# Patient Record
Sex: MALE | URBAN - METROPOLITAN AREA
[De-identification: names, ages, dates, MRNs, and addresses within clinical notes are randomized per-mention and may not be internally consistent; named-entity substitution may affect disease eponyms.]

---

## 2024-03-01 ENCOUNTER — TELEMEDICINE (OUTPATIENT)
Dept: BEHAVIORAL HEALTH | Facility: CLINIC | Age: 34
End: 2024-03-01
Payer: COMMERCIAL

## 2024-03-01 DIAGNOSIS — F41.9 ANXIETY: ICD-10-CM

## 2024-03-01 DIAGNOSIS — F33.0 MILD RECURRENT MAJOR DEPRESSION (CMS-HCC): ICD-10-CM

## 2024-03-01 PROCEDURE — 90837 PSYTX W PT 60 MINUTES: CPT | Performed by: COUNSELOR

## 2024-03-01 NOTE — PROGRESS NOTES
"Start time:1:05 pm  End time: 2:00 pm  Total time: 55 minutes  Last visit: 5/11/2022  Diagnosis: Anxiety, MDD  Micki returns to therapy today reporting his divorce finalized December 2022.  He is currently in a committed relationship with Vick, \"things are great.\"  He reports increased anxiety and recent panic attacks triggered by a serious conversation about getting  and guilt feelings about messaging other girls.  Said his two worst days on 2/19 & 2/20 - experienced lightheadedness,  irrational thoughts, extreme worry and was unable to sleep or concentrate.  He reached out to his mother for support and later decided to confess to Vick.  He was surprised/pleased that Vick was supportive, \"it lifted a huge weight off my chest.\"  He was very emotional during this visit.  He feels guilty for his actions (messaging other girls) and notes feeling afraid to get . Marv recognizes that he lacks self confidence and returns to therapy seeking help to gain more awareness and skills to manage his anxiety.   He is still working at [employer], enjoys his work.  PHQ9-16, GAD7-16.  His PCP prescribed Prozac this past Saturday.  Provider welcomed him back to therapy, offered supportive feedback.  Offered psychoeducation on his illness.  Reviewed CBT - cognitive triangle and coping skills. Spent time exploring his expectations for treatment, used the miracle question.  Reflected on his self care. F/U 1 week  HW-  Dedicate 10 minutes daily to self care    "

## 2024-03-12 ENCOUNTER — TELEMEDICINE (OUTPATIENT)
Dept: BEHAVIORAL HEALTH | Facility: CLINIC | Age: 34
End: 2024-03-12
Payer: COMMERCIAL

## 2024-03-12 DIAGNOSIS — F41.1 GENERALIZED ANXIETY DISORDER: ICD-10-CM

## 2024-03-12 DIAGNOSIS — F33.1 MAJOR DEPRESSIVE DISORDER, RECURRENT EPISODE, MODERATE WITH ANXIOUS DISTRESS (MULTI): ICD-10-CM

## 2024-03-12 PROCEDURE — 90837 PSYTX W PT 60 MINUTES: CPT | Performed by: COUNSELOR

## 2024-03-12 NOTE — PROGRESS NOTES
"Start time:1:05 pm  End time: 2:00 pm  Total time: 55 minutes  Last visit: 3/1/2022  Telehealth visit, pt consented  Diagnosis: Anxiety, MDD  Provider joined, probed. Marv reports feeling better since starting Prozac over a week ago.  Said he has been more active and hyper-focused on what kind of man he wants to be. He returned to the gym this past week, has been journaling nightly, feels more self aware and is being mindful of Vick's needs.  He reports having a panic attack this past Saturday while at his parents home after having an irrational thought about Vick. It took 2 hours to regulate by challenging his thoughts and using positive self talk. Provider praised.  We explored negative self talk, comparisons, projections and judgements.  Engaged to explore his sense of self and issues of guilt and shame.  Reflected on early life - parents  when he was 18 months old.  Marv shared memory of in 1st grade feeling ashamed that he was in a broken home.  At 19 y/o memory of father telling him to go be an adult, they did not talk for the next 3 years.  He acknowledges guilt and shame feelings attached to his childhood. Provider offered psychoeducation on emotional triggers and healing. Developed treatment goal \"To be happy with myself no matter what.\" Reviewed CBT cognitive distortions and healthy coping. Reinforced realistic expectations. F/U  1 week  HW-  -daily use positive self talk  -journal daily gratitude  -structure/routine  -exercise 5 to 6 days/week up to 1 hour   -notice/record emotional triggers    "

## 2024-03-19 ENCOUNTER — TELEMEDICINE (OUTPATIENT)
Dept: BEHAVIORAL HEALTH | Facility: CLINIC | Age: 34
End: 2024-03-19
Payer: COMMERCIAL

## 2024-03-19 DIAGNOSIS — F33.1 MODERATE RECURRENT MAJOR DEPRESSION (MULTI): ICD-10-CM

## 2024-03-19 DIAGNOSIS — F41.1 GENERALIZED ANXIETY DISORDER: ICD-10-CM

## 2024-03-19 PROCEDURE — 90837 PSYTX W PT 60 MINUTES: CPT | Performed by: COUNSELOR

## 2024-03-19 NOTE — PROGRESS NOTES
"Start time:3:05 pm  End time: 4:00 pm  Total time: 55 minutes  Last visit: 3/12/2024  Telehealth visit, pt consented  Diagnosis: Anxiety, MDD  Treatment goal- \"To be happy with myself no matter what.\"   Provider joined, probed.  He reports doing pretty well, rated 5/10 (10 the highest).  He denies depression and denies having any bad thoughts but notices his heart beating very intensely.  Shared that he went out drinking this past Saturday, had 5 drinks and 2 shots. He denies getting drunk.  Said he had bad thoughts on Sunday and \"overcame\" them by using positive self talk.  He noted feeling triggered in a conversation about getting  and having children. He is uncertain of his core beliefs related to same but does acknowledge self doubt.  He recalled memory from 8 years old of being overly fearful, not asking for what he wanted/needed to avoid upsetting his mother.  Provider offered supportive feedback. Offered more psychoeducation on anxiety, triggers and coping. Explored self love/trust. Used CBT and supportive therapy. F/U 1 week  HW-  -continue mirror work with affirmations daily  -continue routine/structure  -Define self love, self-forgiveness  -monitor alcohol use  -notice/track triggers      "

## 2024-03-22 ENCOUNTER — TELEMEDICINE (OUTPATIENT)
Dept: BEHAVIORAL HEALTH | Facility: CLINIC | Age: 34
End: 2024-03-22
Payer: COMMERCIAL

## 2024-03-22 DIAGNOSIS — F33.1 MODERATE RECURRENT MAJOR DEPRESSION (MULTI): ICD-10-CM

## 2024-03-22 DIAGNOSIS — F41.1 GENERALIZED ANXIETY DISORDER: ICD-10-CM

## 2024-03-22 PROCEDURE — 90837 PSYTX W PT 60 MINUTES: CPT | Performed by: COUNSELOR

## 2024-03-22 NOTE — PROGRESS NOTES
"Start time: 3:04 pm  End time: 4:00 pm  Total time: 56 minutes  Last visit: 3/19/2024  Telehealth visit, pt consented  Diagnosis: Anxiety, MDD  Treatment goal- \"To be happy with myself no matter what.\"   Marv returns today reporting doing better than he was a few days ago, \"having more good days than bad.\"  Provider joined. Processed. Said he used marijuana as a reward a couple of days ago and noticed it caused bad thoughts. He has smoked for the past 10+ years and notes determined now to quit along with alcohol.  He also shared feeling more comfortable with the idea of marriage after recognizing Vick's support.  Provider probed, offered supportive feedback.  Encouraged abstinence and explored alternatives to the substance use.   Reflected briefly on he support he gets from Vick and continued our discussion from the previous visit on self love and self trust.  Reinforced mindfulness and self care.  Used helpful CBT skills.  Vick joined @ 3:30 pm.  She shared positive feelings for Marv and concerns that he has not fully healed from his ex wife and past traumas.  She thinks he is trying too hard to be perfect, struggles with people pleasing and blames external factors. She thinks he needs to stop putting a time limit on things such as marriage and deal with his guilt.   Marv appeared very comfortable in this discussion, we will process in our next visit. F/U 1 week  "

## 2024-03-26 ENCOUNTER — TELEMEDICINE (OUTPATIENT)
Dept: BEHAVIORAL HEALTH | Facility: CLINIC | Age: 34
End: 2024-03-26
Payer: COMMERCIAL

## 2024-03-26 DIAGNOSIS — F41.1 GENERALIZED ANXIETY DISORDER: ICD-10-CM

## 2024-03-26 DIAGNOSIS — F33.0 MILD RECURRENT MAJOR DEPRESSION (CMS-HCC): ICD-10-CM

## 2024-03-26 PROCEDURE — 90837 PSYTX W PT 60 MINUTES: CPT | Performed by: COUNSELOR

## 2024-03-26 NOTE — PROGRESS NOTES
"Start time: 3:04 pm  End time: 4:00 pm  Total time: 56 minutes  Last visit: 3/19/2024  Telehealth visit, pt consented  Diagnosis: Anxiety, MDD  Treatment goal- \"To be happy with myself no matter what.\"   Marv reports \"doing better.\" He denies any \"bad\" thoughts over the past few days.  Said he changed his mind regarding alcohol and had a few drinks on Sunday, \"it did not negatively affect me.\"  He reports consistent journaling, mirror work and being more assertive with Kassey is helping.  He is also working to repair broken family relationships including with his grandmother and father and has started going to [employer].  Marv shared a situation that triggered guilt, shame. We unraveled, identified unhelpful core beliefs.  Additionally processed thoughts about marriage - reflected on his needs/wants/expectations.  He notes feeling more comfortable with the idea of marriage. Provider offered feedback.  Gently challenged all or nothing thinking, offered more psychoeducation on anxiety. Encouraged pt to not over-do it and to remain mindful of the change process. Discussed priorities and values. Used CBT and supportive therapy. F/U 1 week.   HW-   Identify top 10 values, prioritize  Mirror work with affirmations    "

## 2024-03-29 ENCOUNTER — APPOINTMENT (OUTPATIENT)
Dept: BEHAVIORAL HEALTH | Facility: CLINIC | Age: 34
End: 2024-03-29
Payer: COMMERCIAL

## 2024-04-05 ENCOUNTER — TELEMEDICINE (OUTPATIENT)
Dept: BEHAVIORAL HEALTH | Facility: CLINIC | Age: 34
End: 2024-04-05
Payer: COMMERCIAL

## 2024-04-05 DIAGNOSIS — F33.0 MILD RECURRENT MAJOR DEPRESSION (CMS-HCC): ICD-10-CM

## 2024-04-05 DIAGNOSIS — F41.1 GENERALIZED ANXIETY DISORDER: ICD-10-CM

## 2024-04-05 PROCEDURE — 90834 PSYTX W PT 45 MINUTES: CPT | Performed by: COUNSELOR

## 2024-04-05 NOTE — PROGRESS NOTES
"Start time: 9:15 pm   End time: 10:00 am  Total time: 45 minutes  Last visit: 3/26/2024  Telehealth visit, pt consented  Diagnosis: Anxiety, MDD  Treatment goal- \"To be happy with myself no matter what.\"   Marv reports \"feeling better\" rated 4/10 (10 the highest). Provider joined.  Aided in processin) Conflict with Vick last night.  She confronted him about an issue, expressed her upset emotions. He denied over reacting and is pleased overall with the way he managed himself.  2)  \"bad\" thoughts this past Saturday while golfing, felt unable to \"speak his mind.\"  Provider praised increased awareness and assessed his understanding of the CBT cognitive triangle.  Helped Marv identify thinking errors. Reinforced self compassion. F/U 1 week  Advised pt that I resigned my position at  effective . Discussed options for ongoing treatment.                "

## 2024-04-12 ENCOUNTER — TELEMEDICINE (OUTPATIENT)
Dept: BEHAVIORAL HEALTH | Facility: CLINIC | Age: 34
End: 2024-04-12
Payer: COMMERCIAL

## 2024-04-12 DIAGNOSIS — F33.0 MILD RECURRENT MAJOR DEPRESSION (CMS-HCC): ICD-10-CM

## 2024-04-12 DIAGNOSIS — F41.1 GENERALIZED ANXIETY DISORDER: ICD-10-CM

## 2024-04-12 PROCEDURE — 90837 PSYTX W PT 60 MINUTES: CPT | Performed by: COUNSELOR

## 2024-04-12 NOTE — PROGRESS NOTES
"Start time: 9:00 am   End time: 10:00 am  Total time: 60 minutes  Last visit: 4/5/2024  Telehealth visit, pt consented  Diagnosis: Anxiety, MDD  Treatment goal- \"To be happy with myself no matter what.\"   Marv reports doing good, denies any \"episodes\" and notes doing well keeping up with his routine.  He shared an example of \"talking through\" a couple of stressful situations with Vick.  Said they were able to compromise,  \"we hugged, it felt good.\" He and Vick will be taking his parents to a [employer] game tomorrow. He continues to set boundaries with his mother and his relationship with his bio father remains strained due to his father's inconsistencies.  He and Vick are talking more about moving forward with marriage.  And he continues to grieve his previous marriage. We reviewed CBT skills and coping strategies. Reflected on grieving, explored forgiveness.  Reinforced commitment to his mental health, self care and mindfulness.   Marv is aware that I resigned my position at  and understands his options.   "

## 2024-04-26 ENCOUNTER — APPOINTMENT (OUTPATIENT)
Dept: BEHAVIORAL HEALTH | Facility: CLINIC | Age: 34
End: 2024-04-26
Payer: COMMERCIAL

## 2024-10-28 ENCOUNTER — HOSPITAL ENCOUNTER (EMERGENCY)
Facility: HOSPITAL | Age: 34
Discharge: HOME | End: 2024-10-28
Payer: COMMERCIAL

## 2024-10-28 VITALS
DIASTOLIC BLOOD PRESSURE: 88 MMHG | WEIGHT: 217 LBS | OXYGEN SATURATION: 98 % | SYSTOLIC BLOOD PRESSURE: 139 MMHG | HEART RATE: 57 BPM | TEMPERATURE: 97.7 F | BODY MASS INDEX: 29.39 KG/M2 | RESPIRATION RATE: 18 BRPM | HEIGHT: 72 IN

## 2024-10-28 DIAGNOSIS — A08.4 VIRAL GASTROENTERITIS: Primary | ICD-10-CM

## 2024-10-28 LAB
ALBUMIN SERPL BCP-MCNC: 4.5 G/DL (ref 3.4–5)
ALP SERPL-CCNC: 51 U/L (ref 33–120)
ALT SERPL W P-5'-P-CCNC: 14 U/L (ref 10–52)
ANION GAP SERPL CALC-SCNC: 12 MMOL/L (ref 10–20)
AST SERPL W P-5'-P-CCNC: 17 U/L (ref 9–39)
BASOPHILS # BLD AUTO: 0.02 X10*3/UL (ref 0–0.1)
BASOPHILS NFR BLD AUTO: 0.5 %
BILIRUB SERPL-MCNC: 1 MG/DL (ref 0–1.2)
BUN SERPL-MCNC: 12 MG/DL (ref 6–23)
CALCIUM SERPL-MCNC: 9.3 MG/DL (ref 8.6–10.3)
CHLORIDE SERPL-SCNC: 102 MMOL/L (ref 98–107)
CO2 SERPL-SCNC: 26 MMOL/L (ref 21–32)
CREAT SERPL-MCNC: 0.98 MG/DL (ref 0.5–1.3)
EGFRCR SERPLBLD CKD-EPI 2021: >90 ML/MIN/1.73M*2
EOSINOPHIL # BLD AUTO: 0.07 X10*3/UL (ref 0–0.7)
EOSINOPHIL NFR BLD AUTO: 1.8 %
ERYTHROCYTE [DISTWIDTH] IN BLOOD BY AUTOMATED COUNT: 11.8 % (ref 11.5–14.5)
GLUCOSE SERPL-MCNC: 104 MG/DL (ref 74–99)
HCT VFR BLD AUTO: 44.1 % (ref 41–52)
HGB BLD-MCNC: 15.5 G/DL (ref 13.5–17.5)
IMM GRANULOCYTES # BLD AUTO: 0.01 X10*3/UL (ref 0–0.7)
IMM GRANULOCYTES NFR BLD AUTO: 0.3 % (ref 0–0.9)
LIPASE SERPL-CCNC: 8 U/L (ref 9–82)
LYMPHOCYTES # BLD AUTO: 0.83 X10*3/UL (ref 1.2–4.8)
LYMPHOCYTES NFR BLD AUTO: 21.9 %
MCH RBC QN AUTO: 31.6 PG (ref 26–34)
MCHC RBC AUTO-ENTMCNC: 35.1 G/DL (ref 32–36)
MCV RBC AUTO: 90 FL (ref 80–100)
MONOCYTES # BLD AUTO: 0.42 X10*3/UL (ref 0.1–1)
MONOCYTES NFR BLD AUTO: 11.1 %
NEUTROPHILS # BLD AUTO: 2.44 X10*3/UL (ref 1.2–7.7)
NEUTROPHILS NFR BLD AUTO: 64.4 %
NRBC BLD-RTO: 0 /100 WBCS (ref 0–0)
PLATELET # BLD AUTO: 194 X10*3/UL (ref 150–450)
POTASSIUM SERPL-SCNC: 4 MMOL/L (ref 3.5–5.3)
PROT SERPL-MCNC: 7.9 G/DL (ref 6.4–8.2)
RBC # BLD AUTO: 4.9 X10*6/UL (ref 4.5–5.9)
SODIUM SERPL-SCNC: 136 MMOL/L (ref 136–145)
WBC # BLD AUTO: 3.8 X10*3/UL (ref 4.4–11.3)

## 2024-10-28 PROCEDURE — 80053 COMPREHEN METABOLIC PANEL: CPT | Performed by: NURSE PRACTITIONER

## 2024-10-28 PROCEDURE — 99284 EMERGENCY DEPT VISIT MOD MDM: CPT | Mod: 25

## 2024-10-28 PROCEDURE — 85025 COMPLETE CBC W/AUTO DIFF WBC: CPT | Performed by: NURSE PRACTITIONER

## 2024-10-28 PROCEDURE — 96374 THER/PROPH/DIAG INJ IV PUSH: CPT

## 2024-10-28 PROCEDURE — 96375 TX/PRO/DX INJ NEW DRUG ADDON: CPT

## 2024-10-28 PROCEDURE — 83690 ASSAY OF LIPASE: CPT | Performed by: NURSE PRACTITIONER

## 2024-10-28 PROCEDURE — 36415 COLL VENOUS BLD VENIPUNCTURE: CPT | Performed by: NURSE PRACTITIONER

## 2024-10-28 PROCEDURE — 2500000004 HC RX 250 GENERAL PHARMACY W/ HCPCS (ALT 636 FOR OP/ED): Performed by: NURSE PRACTITIONER

## 2024-10-28 PROCEDURE — 2500000001 HC RX 250 WO HCPCS SELF ADMINISTERED DRUGS (ALT 637 FOR MEDICARE OP): Performed by: NURSE PRACTITIONER

## 2024-10-28 RX ORDER — ONDANSETRON HYDROCHLORIDE 2 MG/ML
4 INJECTION, SOLUTION INTRAVENOUS ONCE
Status: COMPLETED | OUTPATIENT
Start: 2024-10-28 | End: 2024-10-28

## 2024-10-28 RX ORDER — ACETAMINOPHEN 325 MG/1
975 TABLET ORAL ONCE
Status: COMPLETED | OUTPATIENT
Start: 2024-10-28 | End: 2024-10-28

## 2024-10-28 RX ORDER — PANTOPRAZOLE SODIUM 40 MG/10ML
40 INJECTION, POWDER, LYOPHILIZED, FOR SOLUTION INTRAVENOUS ONCE
Status: COMPLETED | OUTPATIENT
Start: 2024-10-28 | End: 2024-10-28

## 2024-10-28 RX ORDER — ONDANSETRON 4 MG/1
4 TABLET, ORALLY DISINTEGRATING ORAL EVERY 8 HOURS PRN
Qty: 15 TABLET | Refills: 0 | Status: SHIPPED | OUTPATIENT
Start: 2024-10-28 | End: 2024-11-02

## 2024-10-28 ASSESSMENT — COLUMBIA-SUICIDE SEVERITY RATING SCALE - C-SSRS
1. IN THE PAST MONTH, HAVE YOU WISHED YOU WERE DEAD OR WISHED YOU COULD GO TO SLEEP AND NOT WAKE UP?: NO
2. HAVE YOU ACTUALLY HAD ANY THOUGHTS OF KILLING YOURSELF?: NO
6. HAVE YOU EVER DONE ANYTHING, STARTED TO DO ANYTHING, OR PREPARED TO DO ANYTHING TO END YOUR LIFE?: NO

## 2024-10-28 ASSESSMENT — PAIN - FUNCTIONAL ASSESSMENT: PAIN_FUNCTIONAL_ASSESSMENT: 0-10

## 2024-10-28 ASSESSMENT — PAIN SCALES - GENERAL: PAINLEVEL_OUTOF10: 7

## 2024-10-28 ASSESSMENT — PAIN DESCRIPTION - PROGRESSION: CLINICAL_PROGRESSION: NOT CHANGED

## 2024-10-28 ASSESSMENT — PAIN DESCRIPTION - ONSET: ONSET: SUDDEN

## 2024-10-28 ASSESSMENT — LIFESTYLE VARIABLES
HAVE YOU EVER FELT YOU SHOULD CUT DOWN ON YOUR DRINKING: NO
EVER FELT BAD OR GUILTY ABOUT YOUR DRINKING: NO
EVER HAD A DRINK FIRST THING IN THE MORNING TO STEADY YOUR NERVES TO GET RID OF A HANGOVER: NO
HAVE PEOPLE ANNOYED YOU BY CRITICIZING YOUR DRINKING: NO
TOTAL SCORE: 0

## 2024-10-28 ASSESSMENT — PAIN DESCRIPTION - PAIN TYPE: TYPE: ACUTE PAIN

## 2024-10-28 ASSESSMENT — PAIN DESCRIPTION - LOCATION: LOCATION: HEAD

## 2024-10-28 ASSESSMENT — PAIN DESCRIPTION - DESCRIPTORS: DESCRIPTORS: ACHING

## 2024-10-28 ASSESSMENT — PAIN DESCRIPTION - FREQUENCY: FREQUENCY: CONSTANT/CONTINUOUS
